# Patient Record
Sex: FEMALE | Race: WHITE | NOT HISPANIC OR LATINO
[De-identification: names, ages, dates, MRNs, and addresses within clinical notes are randomized per-mention and may not be internally consistent; named-entity substitution may affect disease eponyms.]

---

## 2017-04-27 ENCOUNTER — APPOINTMENT (OUTPATIENT)
Dept: OBGYN | Facility: CLINIC | Age: 52
End: 2017-04-27

## 2017-04-27 VITALS
SYSTOLIC BLOOD PRESSURE: 119 MMHG | HEIGHT: 70 IN | DIASTOLIC BLOOD PRESSURE: 70 MMHG | WEIGHT: 171 LBS | OXYGEN SATURATION: 98 % | HEART RATE: 64 BPM | BODY MASS INDEX: 24.48 KG/M2

## 2017-04-27 DIAGNOSIS — Z12.39 ENCOUNTER FOR OTHER SCREENING FOR MALIGNANT NEOPLASM OF BREAST: ICD-10-CM

## 2017-04-27 DIAGNOSIS — Z98.890 OTHER SPECIFIED POSTPROCEDURAL STATES: ICD-10-CM

## 2017-04-27 DIAGNOSIS — N39.3 STRESS INCONTINENCE (FEMALE) (MALE): ICD-10-CM

## 2017-05-04 ENCOUNTER — APPOINTMENT (OUTPATIENT)
Dept: MAMMOGRAPHY | Facility: CLINIC | Age: 52
End: 2017-05-04

## 2017-05-31 ENCOUNTER — TRANSCRIPTION ENCOUNTER (OUTPATIENT)
Age: 52
End: 2017-05-31

## 2017-07-02 ENCOUNTER — FORM ENCOUNTER (OUTPATIENT)
Age: 52
End: 2017-07-02

## 2017-07-03 ENCOUNTER — APPOINTMENT (OUTPATIENT)
Dept: ULTRASOUND IMAGING | Facility: CLINIC | Age: 52
End: 2017-07-03

## 2017-07-03 ENCOUNTER — OUTPATIENT (OUTPATIENT)
Dept: OUTPATIENT SERVICES | Facility: HOSPITAL | Age: 52
LOS: 1 days | End: 2017-07-03
Payer: COMMERCIAL

## 2017-07-03 ENCOUNTER — APPOINTMENT (OUTPATIENT)
Dept: MAMMOGRAPHY | Facility: CLINIC | Age: 52
End: 2017-07-03

## 2017-07-03 DIAGNOSIS — R92.2 INCONCLUSIVE MAMMOGRAM: ICD-10-CM

## 2017-07-03 DIAGNOSIS — Z12.39 ENCOUNTER FOR OTHER SCREENING FOR MALIGNANT NEOPLASM OF BREAST: ICD-10-CM

## 2017-07-03 DIAGNOSIS — Z00.8 ENCOUNTER FOR OTHER GENERAL EXAMINATION: ICD-10-CM

## 2017-07-03 PROCEDURE — 77067 SCR MAMMO BI INCL CAD: CPT

## 2017-07-03 PROCEDURE — 76641 ULTRASOUND BREAST COMPLETE: CPT

## 2017-07-03 PROCEDURE — 77063 BREAST TOMOSYNTHESIS BI: CPT

## 2017-07-05 DIAGNOSIS — R92.8 OTHER ABNORMAL AND INCONCLUSIVE FINDINGS ON DIAGNOSTIC IMAGING OF BREAST: ICD-10-CM

## 2017-07-09 ENCOUNTER — FORM ENCOUNTER (OUTPATIENT)
Age: 52
End: 2017-07-09

## 2017-07-10 ENCOUNTER — APPOINTMENT (OUTPATIENT)
Dept: MAMMOGRAPHY | Facility: CLINIC | Age: 52
End: 2017-07-10

## 2017-07-10 ENCOUNTER — OUTPATIENT (OUTPATIENT)
Dept: OUTPATIENT SERVICES | Facility: HOSPITAL | Age: 52
LOS: 1 days | End: 2017-07-10
Payer: COMMERCIAL

## 2017-07-10 ENCOUNTER — APPOINTMENT (OUTPATIENT)
Dept: ULTRASOUND IMAGING | Facility: CLINIC | Age: 52
End: 2017-07-10

## 2017-07-10 DIAGNOSIS — Z00.8 ENCOUNTER FOR OTHER GENERAL EXAMINATION: ICD-10-CM

## 2017-07-10 PROCEDURE — 77065 DX MAMMO INCL CAD UNI: CPT

## 2017-07-10 PROCEDURE — 76642 ULTRASOUND BREAST LIMITED: CPT

## 2017-07-10 PROCEDURE — G0279: CPT

## 2017-07-11 ENCOUNTER — OTHER (OUTPATIENT)
Age: 52
End: 2017-07-11

## 2017-07-24 ENCOUNTER — TRANSCRIPTION ENCOUNTER (OUTPATIENT)
Age: 52
End: 2017-07-24

## 2017-09-16 ENCOUNTER — TRANSCRIPTION ENCOUNTER (OUTPATIENT)
Age: 52
End: 2017-09-16

## 2018-02-07 ENCOUNTER — FORM ENCOUNTER (OUTPATIENT)
Age: 53
End: 2018-02-07

## 2018-02-08 ENCOUNTER — OUTPATIENT (OUTPATIENT)
Dept: OUTPATIENT SERVICES | Facility: HOSPITAL | Age: 53
LOS: 1 days | End: 2018-02-08
Payer: COMMERCIAL

## 2018-02-08 ENCOUNTER — APPOINTMENT (OUTPATIENT)
Dept: ULTRASOUND IMAGING | Facility: CLINIC | Age: 53
End: 2018-02-08
Payer: COMMERCIAL

## 2018-02-08 DIAGNOSIS — Z00.8 ENCOUNTER FOR OTHER GENERAL EXAMINATION: ICD-10-CM

## 2018-02-08 PROCEDURE — 76642 ULTRASOUND BREAST LIMITED: CPT

## 2018-02-08 PROCEDURE — 76642 ULTRASOUND BREAST LIMITED: CPT | Mod: 26,50

## 2018-02-12 ENCOUNTER — OTHER (OUTPATIENT)
Age: 53
End: 2018-02-12

## 2018-02-13 ENCOUNTER — OTHER (OUTPATIENT)
Age: 53
End: 2018-02-13

## 2018-02-26 ENCOUNTER — TRANSCRIPTION ENCOUNTER (OUTPATIENT)
Age: 53
End: 2018-02-26

## 2018-04-12 ENCOUNTER — APPOINTMENT (OUTPATIENT)
Dept: OBGYN | Facility: CLINIC | Age: 53
End: 2018-04-12
Payer: COMMERCIAL

## 2018-04-12 VITALS
HEART RATE: 69 BPM | WEIGHT: 142 LBS | DIASTOLIC BLOOD PRESSURE: 70 MMHG | SYSTOLIC BLOOD PRESSURE: 108 MMHG | BODY MASS INDEX: 20.33 KG/M2 | OXYGEN SATURATION: 99 % | HEIGHT: 70 IN

## 2018-04-12 DIAGNOSIS — Z13.820 ENCOUNTER FOR SCREENING FOR OSTEOPOROSIS: ICD-10-CM

## 2018-04-12 PROCEDURE — 99396 PREV VISIT EST AGE 40-64: CPT

## 2018-04-13 LAB — HPV HIGH+LOW RISK DNA PNL CVX: NOT DETECTED

## 2018-04-17 LAB — CYTOLOGY CVX/VAG DOC THIN PREP: NORMAL

## 2019-04-02 ENCOUNTER — OTHER (OUTPATIENT)
Age: 54
End: 2019-04-02

## 2019-04-02 DIAGNOSIS — N60.09 SOLITARY CYST OF UNSPECIFIED BREAST: ICD-10-CM

## 2019-04-03 ENCOUNTER — FORM ENCOUNTER (OUTPATIENT)
Age: 54
End: 2019-04-03

## 2019-04-04 ENCOUNTER — APPOINTMENT (OUTPATIENT)
Dept: ULTRASOUND IMAGING | Facility: CLINIC | Age: 54
End: 2019-04-04
Payer: COMMERCIAL

## 2019-04-04 ENCOUNTER — OUTPATIENT (OUTPATIENT)
Dept: OUTPATIENT SERVICES | Facility: HOSPITAL | Age: 54
LOS: 1 days | End: 2019-04-04
Payer: COMMERCIAL

## 2019-04-04 ENCOUNTER — APPOINTMENT (OUTPATIENT)
Dept: MAMMOGRAPHY | Facility: CLINIC | Age: 54
End: 2019-04-04
Payer: COMMERCIAL

## 2019-04-04 DIAGNOSIS — Z12.31 ENCOUNTER FOR SCREENING MAMMOGRAM FOR MALIGNANT NEOPLASM OF BREAST: ICD-10-CM

## 2019-04-04 DIAGNOSIS — Z00.8 ENCOUNTER FOR OTHER GENERAL EXAMINATION: ICD-10-CM

## 2019-04-04 PROCEDURE — 76641 ULTRASOUND BREAST COMPLETE: CPT | Mod: 26,50

## 2019-04-04 PROCEDURE — 77063 BREAST TOMOSYNTHESIS BI: CPT | Mod: 26

## 2019-04-04 PROCEDURE — 77063 BREAST TOMOSYNTHESIS BI: CPT

## 2019-04-04 PROCEDURE — 77067 SCR MAMMO BI INCL CAD: CPT | Mod: 26

## 2019-04-04 PROCEDURE — 77067 SCR MAMMO BI INCL CAD: CPT

## 2019-04-04 PROCEDURE — 76641 ULTRASOUND BREAST COMPLETE: CPT

## 2019-08-06 ENCOUNTER — FORM ENCOUNTER (OUTPATIENT)
Age: 54
End: 2019-08-06

## 2019-08-07 ENCOUNTER — OUTPATIENT (OUTPATIENT)
Dept: OUTPATIENT SERVICES | Facility: HOSPITAL | Age: 54
LOS: 1 days | End: 2019-08-07
Payer: COMMERCIAL

## 2019-08-07 ENCOUNTER — APPOINTMENT (OUTPATIENT)
Dept: ULTRASOUND IMAGING | Facility: CLINIC | Age: 54
End: 2019-08-07
Payer: COMMERCIAL

## 2019-08-07 DIAGNOSIS — N93.9 ABNORMAL UTERINE AND VAGINAL BLEEDING, UNSPECIFIED: ICD-10-CM

## 2019-08-07 PROCEDURE — 76830 TRANSVAGINAL US NON-OB: CPT | Mod: 26

## 2019-08-07 PROCEDURE — 76856 US EXAM PELVIC COMPLETE: CPT | Mod: 26,59

## 2019-08-07 PROCEDURE — 76830 TRANSVAGINAL US NON-OB: CPT

## 2019-08-07 PROCEDURE — 76856 US EXAM PELVIC COMPLETE: CPT

## 2019-09-05 ENCOUNTER — APPOINTMENT (OUTPATIENT)
Dept: OBGYN | Facility: CLINIC | Age: 54
End: 2019-09-05

## 2020-06-24 ENCOUNTER — APPOINTMENT (OUTPATIENT)
Dept: OBGYN | Facility: CLINIC | Age: 55
End: 2020-06-24
Payer: COMMERCIAL

## 2020-06-24 VITALS
WEIGHT: 172 LBS | BODY MASS INDEX: 24.62 KG/M2 | SYSTOLIC BLOOD PRESSURE: 118 MMHG | DIASTOLIC BLOOD PRESSURE: 70 MMHG | HEIGHT: 70 IN | TEMPERATURE: 97.4 F | HEART RATE: 71 BPM

## 2020-06-24 DIAGNOSIS — N64.52 NIPPLE DISCHARGE: ICD-10-CM

## 2020-06-24 PROCEDURE — 99396 PREV VISIT EST AGE 40-64: CPT

## 2020-06-24 NOTE — PHYSICAL EXAM
[Acute Distress] : no acute distress [Mass] : no breast mass [Nipple Discharge] : no nipple discharge [Breast Abnormal Secretion Serous Fluid Right] : a serous discharge [Axillary LAD] : no axillary lymphadenopathy [Tender] : non tender

## 2020-06-24 NOTE — CHIEF COMPLAINT
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 USI--not interested in surgery

## 2020-06-26 LAB — HPV HIGH+LOW RISK DNA PNL CVX: NOT DETECTED

## 2020-06-29 LAB — CYTOLOGY CVX/VAG DOC THIN PREP: NORMAL

## 2021-02-02 ENCOUNTER — OUTPATIENT (OUTPATIENT)
Dept: OUTPATIENT SERVICES | Facility: HOSPITAL | Age: 56
LOS: 1 days | End: 2021-02-02
Payer: COMMERCIAL

## 2021-02-02 ENCOUNTER — RESULT REVIEW (OUTPATIENT)
Age: 56
End: 2021-02-02

## 2021-02-02 ENCOUNTER — APPOINTMENT (OUTPATIENT)
Dept: ULTRASOUND IMAGING | Facility: IMAGING CENTER | Age: 56
End: 2021-02-02

## 2021-02-02 ENCOUNTER — APPOINTMENT (OUTPATIENT)
Dept: MAMMOGRAPHY | Facility: IMAGING CENTER | Age: 56
End: 2021-02-02
Payer: COMMERCIAL

## 2021-02-02 DIAGNOSIS — Z00.8 ENCOUNTER FOR OTHER GENERAL EXAMINATION: ICD-10-CM

## 2021-02-02 PROCEDURE — 76641 ULTRASOUND BREAST COMPLETE: CPT | Mod: 26,50

## 2021-02-02 PROCEDURE — 77066 DX MAMMO INCL CAD BI: CPT | Mod: 26

## 2021-02-02 PROCEDURE — G0279: CPT | Mod: 26

## 2021-02-02 PROCEDURE — 76641 ULTRASOUND BREAST COMPLETE: CPT

## 2021-02-02 PROCEDURE — 77066 DX MAMMO INCL CAD BI: CPT

## 2021-02-02 PROCEDURE — G0279: CPT

## 2021-02-03 DIAGNOSIS — F41.9 ANXIETY DISORDER, UNSPECIFIED: ICD-10-CM

## 2021-02-03 RX ORDER — ALPRAZOLAM 1 MG/1
1 TABLET ORAL
Qty: 1 | Refills: 0 | Status: ACTIVE | COMMUNITY
Start: 2021-02-03 | End: 1900-01-01

## 2021-02-05 DIAGNOSIS — R92.1 MAMMOGRAPHIC CALCIFICATION FOUND ON DIAGNOSTIC IMAGING OF BREAST: ICD-10-CM

## 2021-02-12 ENCOUNTER — OUTPATIENT (OUTPATIENT)
Dept: OUTPATIENT SERVICES | Facility: HOSPITAL | Age: 56
LOS: 1 days | End: 2021-02-12
Payer: COMMERCIAL

## 2021-02-12 ENCOUNTER — RESULT REVIEW (OUTPATIENT)
Age: 56
End: 2021-02-12

## 2021-02-12 ENCOUNTER — APPOINTMENT (OUTPATIENT)
Dept: MAMMOGRAPHY | Facility: CLINIC | Age: 56
End: 2021-02-12
Payer: COMMERCIAL

## 2021-02-12 DIAGNOSIS — R92.8 OTHER ABNORMAL AND INCONCLUSIVE FINDINGS ON DIAGNOSTIC IMAGING OF BREAST: ICD-10-CM

## 2021-02-12 DIAGNOSIS — Z00.8 ENCOUNTER FOR OTHER GENERAL EXAMINATION: ICD-10-CM

## 2021-02-12 PROCEDURE — 19081 BX BREAST 1ST LESION STRTCTC: CPT

## 2021-02-12 PROCEDURE — 77065 DX MAMMO INCL CAD UNI: CPT | Mod: 26,LT

## 2021-02-12 PROCEDURE — 88360 TUMOR IMMUNOHISTOCHEM/MANUAL: CPT | Mod: 26

## 2021-02-12 PROCEDURE — 19081 BX BREAST 1ST LESION STRTCTC: CPT | Mod: LT

## 2021-02-12 PROCEDURE — 88360 TUMOR IMMUNOHISTOCHEM/MANUAL: CPT

## 2021-02-12 PROCEDURE — A4648: CPT

## 2021-02-12 PROCEDURE — 88305 TISSUE EXAM BY PATHOLOGIST: CPT | Mod: 26

## 2021-02-12 PROCEDURE — 88305 TISSUE EXAM BY PATHOLOGIST: CPT

## 2021-02-12 PROCEDURE — 77065 DX MAMMO INCL CAD UNI: CPT

## 2021-03-05 ENCOUNTER — OUTPATIENT (OUTPATIENT)
Dept: OUTPATIENT SERVICES | Facility: HOSPITAL | Age: 56
LOS: 1 days | End: 2021-03-05
Payer: COMMERCIAL

## 2021-03-05 ENCOUNTER — APPOINTMENT (OUTPATIENT)
Dept: MRI IMAGING | Facility: CLINIC | Age: 56
End: 2021-03-05
Payer: COMMERCIAL

## 2021-03-05 ENCOUNTER — RESULT REVIEW (OUTPATIENT)
Age: 56
End: 2021-03-05

## 2021-03-05 DIAGNOSIS — Z00.8 ENCOUNTER FOR OTHER GENERAL EXAMINATION: ICD-10-CM

## 2021-03-05 DIAGNOSIS — R22.1 LOCALIZED SWELLING, MASS AND LUMP, NECK: ICD-10-CM

## 2021-03-05 PROCEDURE — 77049 MRI BREAST C-+ W/CAD BI: CPT | Mod: 26

## 2021-03-05 PROCEDURE — C8908: CPT

## 2021-03-05 PROCEDURE — C8937: CPT

## 2021-03-05 PROCEDURE — A9585: CPT

## 2021-03-10 ENCOUNTER — APPOINTMENT (OUTPATIENT)
Dept: MAMMOGRAPHY | Facility: CLINIC | Age: 56
End: 2021-03-10

## 2021-04-27 ENCOUNTER — APPOINTMENT (OUTPATIENT)
Dept: MAMMOGRAPHY | Facility: IMAGING CENTER | Age: 56
End: 2021-04-27
Payer: COMMERCIAL

## 2021-04-27 ENCOUNTER — OUTPATIENT (OUTPATIENT)
Dept: OUTPATIENT SERVICES | Facility: HOSPITAL | Age: 56
LOS: 1 days | End: 2021-04-27
Payer: COMMERCIAL

## 2021-04-27 DIAGNOSIS — Z00.8 ENCOUNTER FOR OTHER GENERAL EXAMINATION: ICD-10-CM

## 2021-04-27 PROCEDURE — 19281 PERQ DEVICE BREAST 1ST IMAG: CPT | Mod: LT

## 2021-04-27 PROCEDURE — C1739: CPT

## 2021-04-27 PROCEDURE — 19281 PERQ DEVICE BREAST 1ST IMAG: CPT

## 2021-04-28 ENCOUNTER — RESULT REVIEW (OUTPATIENT)
Age: 56
End: 2021-04-28

## 2021-05-13 ENCOUNTER — NON-APPOINTMENT (OUTPATIENT)
Age: 56
End: 2021-05-13

## 2021-05-13 DIAGNOSIS — N39.3 STRESS INCONTINENCE (FEMALE) (MALE): ICD-10-CM

## 2021-05-13 DIAGNOSIS — N39.41 URGE INCONTINENCE: ICD-10-CM

## 2021-05-13 DIAGNOSIS — R33.9 RETENTION OF URINE, UNSPECIFIED: ICD-10-CM

## 2022-11-04 ENCOUNTER — NON-APPOINTMENT (OUTPATIENT)
Age: 57
End: 2022-11-04

## 2022-11-20 ENCOUNTER — NON-APPOINTMENT (OUTPATIENT)
Age: 57
End: 2022-11-20

## 2023-01-31 ENCOUNTER — APPOINTMENT (OUTPATIENT)
Dept: OBGYN | Facility: CLINIC | Age: 58
End: 2023-01-31
Payer: COMMERCIAL

## 2023-01-31 VITALS
HEART RATE: 73 BPM | WEIGHT: 176 LBS | DIASTOLIC BLOOD PRESSURE: 72 MMHG | HEIGHT: 70 IN | BODY MASS INDEX: 25.2 KG/M2 | SYSTOLIC BLOOD PRESSURE: 113 MMHG

## 2023-01-31 DIAGNOSIS — N95.9 UNSPECIFIED MENOPAUSAL AND PERIMENOPAUSAL DISORDER: ICD-10-CM

## 2023-01-31 DIAGNOSIS — R68.82 DECREASED LIBIDO: ICD-10-CM

## 2023-01-31 DIAGNOSIS — N81.89 OTHER FEMALE GENITAL PROLAPSE: ICD-10-CM

## 2023-01-31 DIAGNOSIS — N84.1 POLYP OF CERVIX UTERI: ICD-10-CM

## 2023-01-31 DIAGNOSIS — Z87.42 PERSONAL HISTORY OF OTHER DISEASES OF THE FEMALE GENITAL TRACT: ICD-10-CM

## 2023-01-31 DIAGNOSIS — R32 UNSPECIFIED URINARY INCONTINENCE: ICD-10-CM

## 2023-01-31 PROCEDURE — 99214 OFFICE O/P EST MOD 30 MIN: CPT

## 2023-01-31 NOTE — PHYSICAL EXAM
[Chaperone Present] : A chaperone was present in the examining room during all aspects of the physical examination [Appropriately responsive] : appropriately responsive [Alert] : alert [No Acute Distress] : no acute distress [No Lymphadenopathy] : no lymphadenopathy [No Murmurs] : no murmurs [Soft] : soft [Non-tender] : non-tender [Non-distended] : non-distended [No HSM] : No HSM [No Lesions] : no lesions [No Mass] : no mass [Oriented x3] : oriented x3 [Examination Of The Breasts] : a normal appearance [No Masses] : no breast masses were palpable [Labia Majora] : normal [Labia Minora] : normal [Cystocele] : a cystocele [Normal] : normal [Uterine Adnexae] : normal [FreeTextEntry4] : mild PFR [FreeTextEntry5] : 3cm polp noted--unable to remove with ring forcep

## 2023-02-01 LAB — HPV HIGH+LOW RISK DNA PNL CVX: NOT DETECTED

## 2023-02-03 LAB — CYTOLOGY CVX/VAG DOC THIN PREP: ABNORMAL

## 2023-02-06 ENCOUNTER — APPOINTMENT (OUTPATIENT)
Dept: OBGYN | Facility: CLINIC | Age: 58
End: 2023-02-06
Payer: COMMERCIAL

## 2023-02-06 ENCOUNTER — ASOB RESULT (OUTPATIENT)
Age: 58
End: 2023-02-06

## 2023-02-06 PROCEDURE — 76830 TRANSVAGINAL US NON-OB: CPT

## 2023-02-08 ENCOUNTER — NON-APPOINTMENT (OUTPATIENT)
Age: 58
End: 2023-02-08

## 2023-04-10 ENCOUNTER — APPOINTMENT (OUTPATIENT)
Dept: UROGYNECOLOGY | Facility: CLINIC | Age: 58
End: 2023-04-10
Payer: COMMERCIAL

## 2023-04-10 VITALS
DIASTOLIC BLOOD PRESSURE: 77 MMHG | HEIGHT: 70 IN | WEIGHT: 176 LBS | HEART RATE: 80 BPM | BODY MASS INDEX: 25.2 KG/M2 | SYSTOLIC BLOOD PRESSURE: 111 MMHG

## 2023-04-10 DIAGNOSIS — N81.6 RECTOCELE: ICD-10-CM

## 2023-04-10 DIAGNOSIS — N39.3 STRESS INCONTINENCE (FEMALE) (MALE): ICD-10-CM

## 2023-04-10 DIAGNOSIS — N95.2 POSTMENOPAUSAL ATROPHIC VAGINITIS: ICD-10-CM

## 2023-04-10 DIAGNOSIS — N39.41 URGE INCONTINENCE: ICD-10-CM

## 2023-04-10 LAB
BILIRUB UR QL STRIP: NORMAL
CLARITY UR: CLEAR
COLLECTION METHOD: NORMAL
GLUCOSE UR-MCNC: NORMAL
HCG UR QL: 0.2 EU/DL
HGB UR QL STRIP.AUTO: NORMAL
KETONES UR-MCNC: NORMAL
LEUKOCYTE ESTERASE UR QL STRIP: NORMAL
NITRITE UR QL STRIP: NORMAL
PH UR STRIP: 6
PROT UR STRIP-MCNC: NORMAL
SP GR UR STRIP: 1

## 2023-04-10 PROCEDURE — 51701 INSERT BLADDER CATHETER: CPT

## 2023-04-10 PROCEDURE — 99204 OFFICE O/P NEW MOD 45 MIN: CPT | Mod: 25

## 2023-04-10 NOTE — DISCUSSION/SUMMARY
[FreeTextEntry1] : \par Urinary incontinence: Patient presents with symptoms of mixed urinary incontinence.. We discussed possible etiologies of her symptoms including both stress urinary incontinence and overactive bladder. We reviewed management options for both conditions. I recommend further workup of her urinary symptoms with urodynamic testing. We reviewed behavioral and fluid modifications. Written and verbal instructions  were provided to her as well. She will return to my office for urodynamics and followup with me to discuss results and management options further. \par \par Rectocele, minor cystocele: Exam findings reviewed. We reviewed management options for her prolapse including: observation, pelvic floor exercises with and without PT, pessary, and surgical management. \par \par She will RTO for URD and follow up. \par \par The following treatment plan was designed for this patient and provided to her in written form and reviewed extensively. Patient was given a copy to take home: \par Overactive bladder (frequent urination, urinary urgency, difficulty holding urine)\par -Total fluid intake: 1.5 -2 L daily\par \par Ex. 8 oz of tea (can put in a little honey), 2-3 bottles of water (each is 16.9 oz) Drink water slowly (bottle should take hours to finish, not minutes). Don’t binge drink! Do not add anything to the water (no crystal light, lemon, or any flavoring). Do not add vitamin packets. \par \par Avoid: 2nd cup of tea (even if decaf), coffee, iced tea, juices, carbonation (soda, seltzer, sparkling water), alcohol, citrus, spicy foods, artificial sweeteners, chocolate\par \par Stop eating and drinking 2-3 hours before bedtime (sips are ok)\par \par -Try the above fluid changes for at least 6 weeks. If symptoms remain bothersome after 6 weeks, will try adding a medication.\par \par Stress urinary incontinence (leakage with coughing, sneezing, lifting, exercise, sudden movements)\par \par - Will schedule Urodynamics test in my [place] office. Urodynamics test evaluates your bladder function and diagnoses leakage conditions. Test takes ~20 minutes. There is no preparation that you need to do before the test. There are no activity restrictions after the test. \par \par Can try the Revive (order on [employer]) when doing activities that cause leakage. Can use astroglide (lubricant) on applicator when inserting. \par \par -You will have a follow up appointment with me to discuss test results and treatment options \par \par Rectocele (Bulging of back wall of vagina):\par -Nonsurgical and surgical options\par -Will discuss further at follow up visit, once your workup for urine leakage is complete\par

## 2023-04-10 NOTE — PHYSICAL EXAM
[Chaperone Present] : A chaperone was present in the examining room during all aspects of the physical examination [Labia Majora] : were normal [Labia Minora] : were normal [Bartholin's Gland] : both Bartholin's glands were normal  [Normal Appearance] : general appearance was normal [Uterine Adnexae] : were not tender and not enlarged [Normal] : no abnormalities [Aa ____] : Aa [unfilled] [Ba ____] : Ba [unfilled] [C ____] : C [unfilled] [GH ____] : GH [unfilled] [PB ____] : PB [unfilled] [TVL ____] : TVL  [unfilled] [Ap ____] : Ap [unfilled] [Bp ____] : Bp [unfilled] [D ____] : D [unfilled] [Normal rectal exam] : was normal [FreeTextEntry1] : General: Well, appearing. Alert and orientated. No acute distress\par HEENT: Normocephalic, atraumatic and extraocular muscles appear to be intact \par Neck: Full range of motion, no obvious lymphadenopathy, deformities, or masses noted \par Respiratory: Speaking in full sentences comfortably, normal work of breathing and no cough during visit\par Musculoskeletal: active full range of motion in extremities \par Extremities: No upper extremity edema noted\par Skin: no obvious rash or skin lesions\par Neuro: Orientated X 3, speech is fluent, normal rate\par Psych: Normal mood and affect\par  [Tenderness] : ~T no ~M abdominal tenderness observed [Distended] : not distended [de-identified] : polyp visualized [de-identified] : no masses, no sphincter defects palpated

## 2023-04-10 NOTE — PROCEDURE
[FreeTextEntry1] : Sterile straight catheterization was performed to measure a postvoid residual volume which was 30 cc\par

## 2023-04-10 NOTE — REASON FOR VISIT
[Questionnaire Received] : Patient questionnaire received [Intake Form Reviewed] : Patient intake form with past medical history, surgical history, family history and social history reviewed today [Urinary Incontinence] : urinary incontinence [Urinary Urgency] : urinary urgency [Cannot Empty Bladder] : cannot empty bladder [Pelvic Organ Prolapse] : pelvic organ prolapse

## 2023-04-11 DIAGNOSIS — Z85.3 PERSONAL HISTORY OF MALIGNANT NEOPLASM OF BREAST: ICD-10-CM

## 2023-04-11 DIAGNOSIS — Z83.42 FAMILY HISTORY OF FAMILIAL HYPERCHOLESTEROLEMIA: ICD-10-CM

## 2023-04-11 DIAGNOSIS — Z78.9 OTHER SPECIFIED HEALTH STATUS: ICD-10-CM

## 2023-04-11 DIAGNOSIS — Z87.19 PERSONAL HISTORY OF OTHER DISEASES OF THE DIGESTIVE SYSTEM: ICD-10-CM

## 2023-04-11 DIAGNOSIS — Z82.3 FAMILY HISTORY OF STROKE: ICD-10-CM

## 2023-04-11 DIAGNOSIS — Z83.49 FAMILY HISTORY OF OTHER ENDOCRINE, NUTRITIONAL AND METABOLIC DISEASES: ICD-10-CM

## 2023-04-11 LAB
APPEARANCE: CLEAR
BACTERIA: NEGATIVE
BILIRUBIN URINE: NEGATIVE
BLOOD URINE: NEGATIVE
CALCIUM OXALATE CRYSTALS: ABNORMAL
COLOR: NORMAL
GLUCOSE QUALITATIVE U: NEGATIVE
HYALINE CASTS: 0 /LPF
KETONES URINE: NEGATIVE
LEUKOCYTE ESTERASE URINE: NEGATIVE
MICROSCOPIC-UA: NORMAL
NITRITE URINE: NEGATIVE
PH URINE: 6.5
PROTEIN URINE: NEGATIVE
RED BLOOD CELLS URINE: 1 /HPF
SPECIFIC GRAVITY URINE: 1.01
SQUAMOUS EPITHELIAL CELLS: 0 /HPF
UROBILINOGEN URINE: NORMAL
WHITE BLOOD CELLS URINE: 0 /HPF

## 2023-04-12 LAB — BACTERIA UR CULT: NORMAL

## 2023-05-04 ENCOUNTER — APPOINTMENT (OUTPATIENT)
Dept: UROGYNECOLOGY | Facility: CLINIC | Age: 58
End: 2023-05-04
Payer: COMMERCIAL

## 2023-05-04 ENCOUNTER — OUTPATIENT (OUTPATIENT)
Dept: OUTPATIENT SERVICES | Facility: HOSPITAL | Age: 58
LOS: 1 days | End: 2023-05-04
Payer: COMMERCIAL

## 2023-05-04 VITALS
BODY MASS INDEX: 25.05 KG/M2 | HEIGHT: 70 IN | WEIGHT: 175 LBS | DIASTOLIC BLOOD PRESSURE: 76 MMHG | HEART RATE: 66 BPM | SYSTOLIC BLOOD PRESSURE: 117 MMHG

## 2023-05-04 DIAGNOSIS — N39.3 STRESS INCONTINENCE (FEMALE) (MALE): ICD-10-CM

## 2023-05-04 DIAGNOSIS — Z01.818 ENCOUNTER FOR OTHER PREPROCEDURAL EXAMINATION: ICD-10-CM

## 2023-05-04 PROCEDURE — 51784 ANAL/URINARY MUSCLE STUDY: CPT

## 2023-05-04 PROCEDURE — 51797 INTRAABDOMINAL PRESSURE TEST: CPT | Mod: 26

## 2023-05-04 PROCEDURE — 51784 ANAL/URINARY MUSCLE STUDY: CPT | Mod: 26

## 2023-05-04 PROCEDURE — 51729 CYSTOMETROGRAM W/VP&UP: CPT

## 2023-05-04 PROCEDURE — 51797 INTRAABDOMINAL PRESSURE TEST: CPT

## 2023-05-04 PROCEDURE — 51729 CYSTOMETROGRAM W/VP&UP: CPT | Mod: 26

## 2023-05-05 PROBLEM — N39.3 STRESS INCONTINENCE: Status: ACTIVE | Noted: 2023-05-05

## 2023-06-19 ENCOUNTER — APPOINTMENT (OUTPATIENT)
Dept: UROGYNECOLOGY | Facility: CLINIC | Age: 58
End: 2023-06-19

## 2023-06-19 NOTE — HISTORY OF PRESENT ILLNESS
[FreeTextEntry1] : Marianela is a 59yo with mixed incontinence, cystocele, rectocele and vaginal atrophy who presents today for follow up. She was last seen 4/10/23 and recommended to undergo urodynamics and proceed with fluid/behavioral modifications. \par \par Her urodynamics revealed +-300ml, no DO, MCFP 300ml, PVE 0ml. \par \par \par PMHx: breast cancer (s/p lumpectomy and radiation)\par PSHx: lumpectomy 2021, ex lap with salpingectomy (unknown side) 2/2 ectopic pregnancy \par \par Daily fluid intake: 20 oz diet soda, water 36 oz, rare alcohol

## 2023-06-23 ENCOUNTER — NON-APPOINTMENT (OUTPATIENT)
Age: 58
End: 2023-06-23

## 2023-07-20 ENCOUNTER — APPOINTMENT (OUTPATIENT)
Dept: OBGYN | Facility: CLINIC | Age: 58
End: 2023-07-20
Payer: COMMERCIAL

## 2023-07-20 VITALS
DIASTOLIC BLOOD PRESSURE: 71 MMHG | BODY MASS INDEX: 24.24 KG/M2 | HEIGHT: 70 IN | SYSTOLIC BLOOD PRESSURE: 111 MMHG | WEIGHT: 169.3 LBS | HEART RATE: 79 BPM

## 2023-07-20 DIAGNOSIS — N76.0 ACUTE VAGINITIS: ICD-10-CM

## 2023-07-20 DIAGNOSIS — Z01.419 ENCOUNTER FOR GYNECOLOGICAL EXAMINATION (GENERAL) (ROUTINE) W/OUT ABNORMAL FINDINGS: ICD-10-CM

## 2023-07-20 PROCEDURE — 99396 PREV VISIT EST AGE 40-64: CPT

## 2023-07-20 RX ORDER — TERCONAZOLE 80 MG/1
80 SUPPOSITORY VAGINAL
Qty: 1 | Refills: 0 | Status: ACTIVE | COMMUNITY
Start: 2023-07-20 | End: 1900-01-01

## 2023-07-20 NOTE — HISTORY OF PRESENT ILLNESS
[Mammogramdate] : emr [BreastSonogramDate] : emr [PapSmeardate] : emr [BoneDensityDate] : emr [ColonoscopyDate] : emr

## 2024-07-19 DIAGNOSIS — L29.2 PRURITUS VULVAE: ICD-10-CM

## 2024-07-19 RX ORDER — CLOTRIMAZOLE AND BETAMETHASONE DIPROPIONATE 10; .5 MG/G; MG/G
1-0.05 CREAM TOPICAL TWICE DAILY
Qty: 1 | Refills: 2 | Status: ACTIVE | COMMUNITY
Start: 2024-07-19 | End: 1900-01-01

## 2024-08-07 ENCOUNTER — APPOINTMENT (OUTPATIENT)
Dept: OBGYN | Facility: CLINIC | Age: 59
End: 2024-08-07

## 2024-08-07 PROCEDURE — 99459 PELVIC EXAMINATION: CPT

## 2024-08-07 PROCEDURE — 99396 PREV VISIT EST AGE 40-64: CPT

## 2024-08-07 NOTE — PHYSICAL EXAM
[Chaperone Present] : A chaperone was present in the examining room during all aspects of the physical examination [Appropriately responsive] : appropriately responsive [Alert] : alert [No Acute Distress] : no acute distress [No Lymphadenopathy] : no lymphadenopathy [Soft] : soft [Non-tender] : non-tender [Non-distended] : non-distended [No HSM] : No HSM [No Lesions] : no lesions [No Mass] : no mass [Oriented x3] : oriented x3 [Examination Of The Breasts] : a normal appearance [No Masses] : no breast masses were palpable [Labia Majora] : normal [Labia Minora] : normal [Normal] : normal [Uterine Adnexae] : normal [FreeTextEntry2] : bessie sanchez

## 2024-08-07 NOTE — HISTORY OF PRESENT ILLNESS
[FreeTextEntry1] : 58 yo female ELIZABETH [Mammogramdate] : emr [BreastSonogramDate] : emr [PapSmeardate] : emr [BoneDensityDate] : emr [ColonoscopyDate] : emr

## 2025-02-23 ENCOUNTER — NON-APPOINTMENT (OUTPATIENT)
Age: 60
End: 2025-02-23

## 2025-05-16 DIAGNOSIS — C50.919 MALIGNANT NEOPLASM OF UNSPECIFIED SITE OF UNSPECIFIED FEMALE BREAST: ICD-10-CM
